# Patient Record
Sex: FEMALE | Race: WHITE | NOT HISPANIC OR LATINO
[De-identification: names, ages, dates, MRNs, and addresses within clinical notes are randomized per-mention and may not be internally consistent; named-entity substitution may affect disease eponyms.]

---

## 2022-07-28 ENCOUNTER — APPOINTMENT (OUTPATIENT)
Dept: OBGYN | Facility: CLINIC | Age: 52
End: 2022-07-28

## 2022-07-28 VITALS
BODY MASS INDEX: 27.88 KG/M2 | HEIGHT: 60 IN | SYSTOLIC BLOOD PRESSURE: 118 MMHG | DIASTOLIC BLOOD PRESSURE: 77 MMHG | WEIGHT: 142 LBS | HEART RATE: 76 BPM

## 2022-07-28 DIAGNOSIS — U07.1 COVID-19: ICD-10-CM

## 2022-07-28 DIAGNOSIS — Z00.00 ENCOUNTER FOR GENERAL ADULT MEDICAL EXAMINATION W/OUT ABNORMAL FINDINGS: ICD-10-CM

## 2022-07-28 PROCEDURE — 99396 PREV VISIT EST AGE 40-64: CPT

## 2022-07-28 NOTE — HISTORY OF PRESENT ILLNESS
[FreeTextEntry1] : Patient is a 52-year-old  2 para 1-0-1-1 last menstrual period 2022\par Patient presents for annual visit,,, denies any Complaints

## 2022-07-28 NOTE — PHYSICAL EXAM
[Chaperone Present] : A chaperone was present in the examining room during all aspects of the physical examination [Appropriately responsive] : appropriately responsive [Alert] : alert [No Acute Distress] : no acute distress [No Lymphadenopathy] : no lymphadenopathy [Regular Rate Rhythm] : regular rate rhythm [No Murmurs] : no murmurs [Clear to Auscultation B/L] : clear to auscultation bilaterally [Soft] : soft [Non-tender] : non-tender [Non-distended] : non-distended [No HSM] : No HSM [No Lesions] : no lesions [No Mass] : no mass [Oriented x3] : oriented x3 [Examination Of The Breasts] : a normal appearance [No Masses] : no breast masses were palpable [Labia Majora] : normal [Labia Minora] : normal [Normal] : normal [Anteversion] : anteverted [Uterine Adnexae] : normal [FreeTextEntry6] : No masses, no skin changes, nontender, no nipple discharge, no adenopathy. [Tenderness] : nontender [Mass ___ cm] : no uterine mass was palpated

## 2022-07-28 NOTE — PLAN
[FreeTextEntry1] : Patient is a 52-year-old  2 para 1-0-1-1 last menstrual period 2022\par Patient presents for annual visit,,, denies any complaints\par Physical exam reveals a well-developed well-nourished female no apparent distress,,, BMI 27\par Heart regular rhythm and rate, lungs clear, breast no masses nontender no skin change or nipple discharge no adenopathy, abdomen soft nontender no organomegaly.\par Pelvic exam shows normal female external genitalia, vagina no lesions, cervix appropriate size nontender, uterus anteverted normal size nontender, adnexa no mass nontender.\par Pap smear performed\par Prescription for breast mammogram given\par Patient states she had colonoscopy in 2022 with negative findings\par Patient states she was diagnosed COVID in 2022,,, denies any residual symptoms or deficits,,, patient states he has received both vaccination and booster\par Benign exam\par Follow-up 1 year or prior to that as needed

## 2022-08-09 LAB — HPV HIGH+LOW RISK DNA PNL CVX: NOT DETECTED

## 2022-08-12 ENCOUNTER — OFFICE (OUTPATIENT)
Dept: URBAN - METROPOLITAN AREA CLINIC 115 | Facility: CLINIC | Age: 52
Setting detail: OPHTHALMOLOGY
End: 2022-08-12
Payer: COMMERCIAL

## 2022-08-12 DIAGNOSIS — H43.392: ICD-10-CM

## 2022-08-12 DIAGNOSIS — H25.13: ICD-10-CM

## 2022-08-12 PROCEDURE — 92250 FUNDUS PHOTOGRAPHY W/I&R: CPT | Performed by: OPHTHALMOLOGY

## 2022-08-12 PROCEDURE — 92004 COMPRE OPH EXAM NEW PT 1/>: CPT | Performed by: OPHTHALMOLOGY

## 2022-08-12 ASSESSMENT — SPHEQUIV_DERIVED
OD_SPHEQUIV: -1
OS_SPHEQUIV: -1.625

## 2022-08-12 ASSESSMENT — REFRACTION_CURRENTRX
OS_ADD: +1.00
OS_AXIS: 087
OD_ADD: +1.00
OS_SPHERE: -1.00
OD_AXIS: 098
OD_OVR_VA: 20/
OS_CYLINDER: -0.50
OD_CYLINDER: -0.25
OS_VPRISM_DIRECTION: PROGS
OD_VPRISM_DIRECTION: PROGS
OD_SPHERE: -0.75
OS_OVR_VA: 20/

## 2022-08-12 ASSESSMENT — REFRACTION_AUTOREFRACTION
OD_CYLINDER: -0.50
OS_CYLINDER: -0.75
OD_AXIS: 113
OD_SPHERE: -0.75
OS_SPHERE: -1.25
OS_AXIS: 081

## 2022-08-12 ASSESSMENT — TONOMETRY
OD_IOP_MMHG: 11
OS_IOP_MMHG: 17

## 2022-08-12 ASSESSMENT — CONFRONTATIONAL VISUAL FIELD TEST (CVF)
OS_FINDINGS: FULL
OD_FINDINGS: FULL

## 2022-08-12 ASSESSMENT — VISUAL ACUITY
OS_BCVA: 20/20
OD_BCVA: 20/20

## 2023-03-07 DIAGNOSIS — N60.09 SOLITARY CYST OF UNSPECIFIED BREAST: ICD-10-CM

## 2024-06-04 ENCOUNTER — APPOINTMENT (OUTPATIENT)
Dept: OBGYN | Facility: CLINIC | Age: 54
End: 2024-06-04
Payer: COMMERCIAL

## 2024-06-04 VITALS
HEART RATE: 86 BPM | HEIGHT: 60 IN | WEIGHT: 143 LBS | SYSTOLIC BLOOD PRESSURE: 118 MMHG | BODY MASS INDEX: 28.07 KG/M2 | DIASTOLIC BLOOD PRESSURE: 75 MMHG

## 2024-06-04 DIAGNOSIS — Z12.31 ENCOUNTER FOR SCREENING MAMMOGRAM FOR MALIGNANT NEOPLASM OF BREAST: ICD-10-CM

## 2024-06-04 DIAGNOSIS — Z12.39 ENCOUNTER FOR OTHER SCREENING FOR MALIGNANT NEOPLASM OF BREAST: ICD-10-CM

## 2024-06-04 DIAGNOSIS — Z01.419 ENCOUNTER FOR GYNECOLOGICAL EXAMINATION (GENERAL) (ROUTINE) W/OUT ABNORMAL FINDINGS: ICD-10-CM

## 2024-06-04 DIAGNOSIS — Z11.51 ENCOUNTER FOR SCREENING FOR HUMAN PAPILLOMAVIRUS (HPV): ICD-10-CM

## 2024-06-04 DIAGNOSIS — R92.323 MAMMOGRAPHIC FIBROGLANDULAR DENSITY, BILATERAL BREASTS: ICD-10-CM

## 2024-06-04 DIAGNOSIS — Z12.4 ENCOUNTER FOR SCREENING FOR MALIGNANT NEOPLASM OF CERVIX: ICD-10-CM

## 2024-06-04 PROCEDURE — 36415 COLL VENOUS BLD VENIPUNCTURE: CPT

## 2024-06-04 PROCEDURE — 99459 PELVIC EXAMINATION: CPT

## 2024-06-04 PROCEDURE — 99396 PREV VISIT EST AGE 40-64: CPT

## 2024-06-04 NOTE — PLAN
[FreeTextEntry1] : Patient is a 54-year-old  2 para 1-0-1-1 last menstrual 2023 Patient presents for annual visit,, denies any complaints Physical exam reveals a well-developed well-nourished female in no apparent distress,, BMI 28 Heart regular rhythm and rate, lungs clear, breast no mass nontender no skin change no nipple discharge no adenopathy, abdomen soft nontender no organomegaly Pelvic exam shows normal female external genitalia, vagina no lesions, cervix appropriate size nontender, uterus anteverted normal size nontender, adnexa no mass nontender Pap was performed Patient will be given prescription for breast mammogram sonogram since there is extensive family history of breast cancer Discussed at length my risk genetic screening testing Essentially benign GYN exam Follow-up 1 year or prior to that as needed Patient states she had a negative colonoscopy in 2023  Mable was present as a chaperone for the entire assessment and examination of this patient

## 2024-06-04 NOTE — HISTORY OF PRESENT ILLNESS
[FreeTextEntry1] : Patient is a 54-year-old  2 para 1-0-1-1 last menstrual period 2023 Patient presents for annual visit,, denies any complaints

## 2024-06-04 NOTE — PHYSICAL EXAM
[Chaperone Present] : A chaperone was present in the examining room during all aspects of the physical examination [[id number]] : A chaperone was present during the pelvic exam. [FreeTextEntry2] : Mable [Appropriately responsive] : appropriately responsive [Alert] : alert [No Acute Distress] : no acute distress [No Lymphadenopathy] : no lymphadenopathy [Regular Rate Rhythm] : regular rate rhythm [No Murmurs] : no murmurs [Clear to Auscultation B/L] : clear to auscultation bilaterally [Soft] : soft [Non-tender] : non-tender [Non-distended] : non-distended [No HSM] : No HSM [No Lesions] : no lesions [No Mass] : no mass [Oriented x3] : oriented x3 [FreeTextEntry6] : No masses, nontender, no skin change, no nipple discharge, no adenopathy. [Examination Of The Breasts] : a normal appearance [No Masses] : no breast masses were palpable [Labia Majora] : normal [Labia Minora] : normal [Normal] : normal [Tenderness] : nontender [Anteversion] : anteverted [Mass ___ cm] : no uterine mass was palpated [Uterine Adnexae] : normal

## 2024-06-13 LAB — HPV HIGH+LOW RISK DNA PNL CVX: NOT DETECTED

## 2025-08-12 ENCOUNTER — APPOINTMENT (OUTPATIENT)
Dept: OBGYN | Facility: CLINIC | Age: 55
End: 2025-08-12

## 2025-08-12 ENCOUNTER — NON-APPOINTMENT (OUTPATIENT)
Age: 55
End: 2025-08-12

## 2025-08-12 VITALS
WEIGHT: 144 LBS | BODY MASS INDEX: 28.27 KG/M2 | HEART RATE: 93 BPM | HEIGHT: 60 IN | SYSTOLIC BLOOD PRESSURE: 112 MMHG | DIASTOLIC BLOOD PRESSURE: 74 MMHG

## 2025-08-12 DIAGNOSIS — Z12.4 ENCOUNTER FOR SCREENING FOR MALIGNANT NEOPLASM OF CERVIX: ICD-10-CM

## 2025-08-12 PROCEDURE — 99396 PREV VISIT EST AGE 40-64: CPT

## 2025-08-12 PROCEDURE — 99459 PELVIC EXAMINATION: CPT | Mod: NC

## 2025-08-22 LAB — HPV HIGH+LOW RISK DNA PNL CVX: NOT DETECTED
